# Patient Record
Sex: MALE | Race: OTHER | HISPANIC OR LATINO | URBAN - METROPOLITAN AREA
[De-identification: names, ages, dates, MRNs, and addresses within clinical notes are randomized per-mention and may not be internally consistent; named-entity substitution may affect disease eponyms.]

---

## 2020-02-02 ENCOUNTER — EMERGENCY (EMERGENCY)
Facility: HOSPITAL | Age: 50
LOS: 1 days | Discharge: HOME | End: 2020-02-02
Attending: EMERGENCY MEDICINE
Payer: SELF-PAY

## 2020-02-02 VITALS
DIASTOLIC BLOOD PRESSURE: 97 MMHG | HEIGHT: 64.96 IN | HEART RATE: 91 BPM | WEIGHT: 214.95 LBS | SYSTOLIC BLOOD PRESSURE: 142 MMHG | OXYGEN SATURATION: 99 % | RESPIRATION RATE: 18 BRPM | TEMPERATURE: 99 F

## 2020-02-02 LAB
ACETONE SERPL-MCNC: NEGATIVE — SIGNIFICANT CHANGE UP
ALBUMIN SERPL ELPH-MCNC: 3.8 G/DL — SIGNIFICANT CHANGE UP (ref 3.3–5.2)
ALBUMIN SERPL ELPH-MCNC: 4.4 G/DL — SIGNIFICANT CHANGE UP (ref 3.3–5.2)
ALP SERPL-CCNC: 139 U/L — HIGH (ref 40–120)
ALP SERPL-CCNC: 196 U/L — HIGH (ref 40–120)
ALT FLD-CCNC: 25 U/L — SIGNIFICANT CHANGE UP
ALT FLD-CCNC: 27 U/L — SIGNIFICANT CHANGE UP
ANION GAP SERPL CALC-SCNC: 14 MMOL/L — SIGNIFICANT CHANGE UP (ref 5–17)
ANION GAP SERPL CALC-SCNC: 18 MMOL/L — HIGH (ref 5–17)
APPEARANCE UR: CLEAR — SIGNIFICANT CHANGE UP
AST SERPL-CCNC: 18 U/L — SIGNIFICANT CHANGE UP
AST SERPL-CCNC: <5 U/L — SIGNIFICANT CHANGE UP
BACTERIA # UR AUTO: NEGATIVE — SIGNIFICANT CHANGE UP
BASE EXCESS BLDV CALC-SCNC: 2.5 MMOL/L — HIGH (ref -2–2)
BASOPHILS # BLD AUTO: 0.02 K/UL — SIGNIFICANT CHANGE UP (ref 0–0.2)
BASOPHILS NFR BLD AUTO: 0.3 % — SIGNIFICANT CHANGE UP (ref 0–2)
BILIRUB SERPL-MCNC: 0.3 MG/DL — LOW (ref 0.4–2)
BILIRUB SERPL-MCNC: 0.5 MG/DL — SIGNIFICANT CHANGE UP (ref 0.4–2)
BILIRUB UR-MCNC: NEGATIVE — SIGNIFICANT CHANGE UP
BUN SERPL-MCNC: 9 MG/DL — SIGNIFICANT CHANGE UP (ref 8–20)
BUN SERPL-MCNC: 9 MG/DL — SIGNIFICANT CHANGE UP (ref 8–20)
CA-I SERPL-SCNC: 1.14 MMOL/L — LOW (ref 1.15–1.33)
CALCIUM SERPL-MCNC: 8.4 MG/DL — LOW (ref 8.6–10.2)
CALCIUM SERPL-MCNC: 9.3 MG/DL — SIGNIFICANT CHANGE UP (ref 8.6–10.2)
CHLORIDE BLDV-SCNC: 96 MMOL/L — LOW (ref 98–107)
CHLORIDE SERPL-SCNC: 87 MMOL/L — LOW (ref 98–107)
CHLORIDE SERPL-SCNC: 98 MMOL/L — SIGNIFICANT CHANGE UP (ref 98–107)
CK SERPL-CCNC: 103 U/L — SIGNIFICANT CHANGE UP (ref 30–200)
CO2 SERPL-SCNC: 22 MMOL/L — SIGNIFICANT CHANGE UP (ref 22–29)
CO2 SERPL-SCNC: 23 MMOL/L — SIGNIFICANT CHANGE UP (ref 22–29)
COLOR SPEC: YELLOW — SIGNIFICANT CHANGE UP
CREAT SERPL-MCNC: 0.69 MG/DL — SIGNIFICANT CHANGE UP (ref 0.5–1.3)
CREAT SERPL-MCNC: 0.69 MG/DL — SIGNIFICANT CHANGE UP (ref 0.5–1.3)
DIFF PNL FLD: ABNORMAL
EOSINOPHIL # BLD AUTO: 0.11 K/UL — SIGNIFICANT CHANGE UP (ref 0–0.5)
EOSINOPHIL NFR BLD AUTO: 1.6 % — SIGNIFICANT CHANGE UP (ref 0–6)
EPI CELLS # UR: NEGATIVE — SIGNIFICANT CHANGE UP
GAS PNL BLDV: 139 MMOL/L — SIGNIFICANT CHANGE UP (ref 135–145)
GAS PNL BLDV: SIGNIFICANT CHANGE UP
GAS PNL BLDV: SIGNIFICANT CHANGE UP
GLUCOSE BLDC GLUCOMTR-MCNC: 255 MG/DL — HIGH (ref 70–99)
GLUCOSE BLDC GLUCOMTR-MCNC: 312 MG/DL — HIGH (ref 70–99)
GLUCOSE BLDV-MCNC: 403 MG/DL — HIGH (ref 70–99)
GLUCOSE SERPL-MCNC: 280 MG/DL — HIGH (ref 70–99)
GLUCOSE SERPL-MCNC: 414 MG/DL — HIGH (ref 70–99)
GLUCOSE UR QL: 1000 MG/DL
HBA1C BLD-MCNC: 12 % — HIGH (ref 4–5.6)
HCO3 BLDV-SCNC: 26 MMOL/L — SIGNIFICANT CHANGE UP (ref 21–29)
HCT VFR BLD CALC: 44.6 % — SIGNIFICANT CHANGE UP (ref 39–50)
HCT VFR BLDA CALC: 50 — SIGNIFICANT CHANGE UP (ref 39–50)
HGB BLD CALC-MCNC: 16.4 G/DL — SIGNIFICANT CHANGE UP (ref 13–17)
HGB BLD-MCNC: 15.6 G/DL — SIGNIFICANT CHANGE UP (ref 13–17)
HIV 1 & 2 AB SERPL IA.RAPID: SIGNIFICANT CHANGE UP
IMM GRANULOCYTES NFR BLD AUTO: 0.4 % — SIGNIFICANT CHANGE UP (ref 0–1.5)
KETONES UR-MCNC: ABNORMAL
LACTATE BLDV-MCNC: 1.8 MMOL/L — SIGNIFICANT CHANGE UP (ref 0.5–2)
LEUKOCYTE ESTERASE UR-ACNC: NEGATIVE — SIGNIFICANT CHANGE UP
LYMPHOCYTES # BLD AUTO: 2.65 K/UL — SIGNIFICANT CHANGE UP (ref 1–3.3)
LYMPHOCYTES # BLD AUTO: 39.7 % — SIGNIFICANT CHANGE UP (ref 13–44)
MCHC RBC-ENTMCNC: 29.4 PG — SIGNIFICANT CHANGE UP (ref 27–34)
MCHC RBC-ENTMCNC: 35 GM/DL — SIGNIFICANT CHANGE UP (ref 32–36)
MCV RBC AUTO: 84 FL — SIGNIFICANT CHANGE UP (ref 80–100)
MONOCYTES # BLD AUTO: 0.41 K/UL — SIGNIFICANT CHANGE UP (ref 0–0.9)
MONOCYTES NFR BLD AUTO: 6.1 % — SIGNIFICANT CHANGE UP (ref 2–14)
NEUTROPHILS # BLD AUTO: 3.46 K/UL — SIGNIFICANT CHANGE UP (ref 1.8–7.4)
NEUTROPHILS NFR BLD AUTO: 51.9 % — SIGNIFICANT CHANGE UP (ref 43–77)
NITRITE UR-MCNC: NEGATIVE — SIGNIFICANT CHANGE UP
OTHER CELLS CSF MANUAL: 18 ML/DL — SIGNIFICANT CHANGE UP (ref 18–22)
PCO2 BLDV: 46 MMHG — SIGNIFICANT CHANGE UP (ref 35–50)
PH BLDV: 7.39 — SIGNIFICANT CHANGE UP (ref 7.32–7.43)
PH UR: 5 — SIGNIFICANT CHANGE UP (ref 5–8)
PLATELET # BLD AUTO: 173 K/UL — SIGNIFICANT CHANGE UP (ref 150–400)
PO2 BLDV: 42 MMHG — SIGNIFICANT CHANGE UP (ref 25–45)
POTASSIUM BLDV-SCNC: 3.9 MMOL/L — SIGNIFICANT CHANGE UP (ref 3.4–4.5)
POTASSIUM SERPL-MCNC: 3.8 MMOL/L — SIGNIFICANT CHANGE UP (ref 3.5–5.3)
POTASSIUM SERPL-MCNC: 3.8 MMOL/L — SIGNIFICANT CHANGE UP (ref 3.5–5.3)
POTASSIUM SERPL-SCNC: 3.8 MMOL/L — SIGNIFICANT CHANGE UP (ref 3.5–5.3)
POTASSIUM SERPL-SCNC: 3.8 MMOL/L — SIGNIFICANT CHANGE UP (ref 3.5–5.3)
PROT SERPL-MCNC: 6.5 G/DL — LOW (ref 6.6–8.7)
PROT SERPL-MCNC: 7.4 G/DL — SIGNIFICANT CHANGE UP (ref 6.6–8.7)
PROT UR-MCNC: 30 MG/DL
RBC # BLD: 5.31 M/UL — SIGNIFICANT CHANGE UP (ref 4.2–5.8)
RBC # FLD: 12.5 % — SIGNIFICANT CHANGE UP (ref 10.3–14.5)
RBC CASTS # UR COMP ASSIST: SIGNIFICANT CHANGE UP /HPF (ref 0–4)
SAO2 % BLDV: 80 % — SIGNIFICANT CHANGE UP
SODIUM SERPL-SCNC: 127 MMOL/L — LOW (ref 135–145)
SODIUM SERPL-SCNC: 135 MMOL/L — SIGNIFICANT CHANGE UP (ref 135–145)
SP GR SPEC: 1.01 — SIGNIFICANT CHANGE UP (ref 1.01–1.02)
TROPONIN T SERPL-MCNC: <0.01 NG/ML — SIGNIFICANT CHANGE UP (ref 0–0.06)
TSH SERPL-MCNC: 2.76 UIU/ML — SIGNIFICANT CHANGE UP (ref 0.27–4.2)
UROBILINOGEN FLD QL: NEGATIVE MG/DL — SIGNIFICANT CHANGE UP
WBC # BLD: 6.68 K/UL — SIGNIFICANT CHANGE UP (ref 3.8–10.5)
WBC # FLD AUTO: 6.68 K/UL — SIGNIFICANT CHANGE UP (ref 3.8–10.5)
WBC UR QL: SIGNIFICANT CHANGE UP

## 2020-02-02 PROCEDURE — 93010 ELECTROCARDIOGRAM REPORT: CPT

## 2020-02-02 PROCEDURE — 99218: CPT

## 2020-02-02 PROCEDURE — 71046 X-RAY EXAM CHEST 2 VIEWS: CPT | Mod: 26

## 2020-02-02 PROCEDURE — 70450 CT HEAD/BRAIN W/O DYE: CPT | Mod: 26

## 2020-02-02 RX ORDER — SODIUM CHLORIDE 9 MG/ML
1000 INJECTION, SOLUTION INTRAVENOUS
Refills: 0 | Status: DISCONTINUED | OUTPATIENT
Start: 2020-02-02 | End: 2020-02-07

## 2020-02-02 RX ORDER — FLUCONAZOLE 150 MG/1
150 TABLET ORAL ONCE
Refills: 0 | Status: COMPLETED | OUTPATIENT
Start: 2020-02-02 | End: 2020-02-02

## 2020-02-02 RX ORDER — INSULIN LISPRO 100/ML
VIAL (ML) SUBCUTANEOUS
Refills: 0 | Status: DISCONTINUED | OUTPATIENT
Start: 2020-02-02 | End: 2020-02-07

## 2020-02-02 RX ORDER — INSULIN HUMAN 100 [IU]/ML
6 INJECTION, SOLUTION SUBCUTANEOUS ONCE
Refills: 0 | Status: COMPLETED | OUTPATIENT
Start: 2020-02-02 | End: 2020-02-02

## 2020-02-02 RX ORDER — DEXTROSE 50 % IN WATER 50 %
25 SYRINGE (ML) INTRAVENOUS ONCE
Refills: 0 | Status: DISCONTINUED | OUTPATIENT
Start: 2020-02-02 | End: 2020-02-07

## 2020-02-02 RX ORDER — METFORMIN HYDROCHLORIDE 850 MG/1
500 TABLET ORAL
Refills: 0 | Status: DISCONTINUED | OUTPATIENT
Start: 2020-02-02 | End: 2020-02-07

## 2020-02-02 RX ORDER — HUMAN INSULIN 100 [IU]/ML
24 INJECTION, SUSPENSION SUBCUTANEOUS
Refills: 0 | Status: DISCONTINUED | OUTPATIENT
Start: 2020-02-02 | End: 2020-02-07

## 2020-02-02 RX ORDER — FLUCONAZOLE 150 MG/1
100 TABLET ORAL DAILY
Refills: 0 | Status: DISCONTINUED | OUTPATIENT
Start: 2020-02-03 | End: 2020-02-07

## 2020-02-02 RX ORDER — GLUCAGON INJECTION, SOLUTION 0.5 MG/.1ML
1 INJECTION, SOLUTION SUBCUTANEOUS ONCE
Refills: 0 | Status: DISCONTINUED | OUTPATIENT
Start: 2020-02-02 | End: 2020-02-07

## 2020-02-02 RX ORDER — DEXTROSE 50 % IN WATER 50 %
12.5 SYRINGE (ML) INTRAVENOUS ONCE
Refills: 0 | Status: DISCONTINUED | OUTPATIENT
Start: 2020-02-02 | End: 2020-02-07

## 2020-02-02 RX ORDER — SODIUM CHLORIDE 9 MG/ML
3000 INJECTION INTRAMUSCULAR; INTRAVENOUS; SUBCUTANEOUS ONCE
Refills: 0 | Status: COMPLETED | OUTPATIENT
Start: 2020-02-02 | End: 2020-02-02

## 2020-02-02 RX ORDER — HUMAN INSULIN 100 [IU]/ML
24 INJECTION, SUSPENSION SUBCUTANEOUS AT BEDTIME
Refills: 0 | Status: DISCONTINUED | OUTPATIENT
Start: 2020-02-02 | End: 2020-02-07

## 2020-02-02 RX ORDER — DEXTROSE 50 % IN WATER 50 %
15 SYRINGE (ML) INTRAVENOUS ONCE
Refills: 0 | Status: DISCONTINUED | OUTPATIENT
Start: 2020-02-02 | End: 2020-02-07

## 2020-02-02 RX ADMIN — SODIUM CHLORIDE 3000 MILLILITER(S): 9 INJECTION INTRAMUSCULAR; INTRAVENOUS; SUBCUTANEOUS at 17:25

## 2020-02-02 RX ADMIN — INSULIN HUMAN 6 UNIT(S): 100 INJECTION, SOLUTION SUBCUTANEOUS at 09:58

## 2020-02-02 RX ADMIN — SODIUM CHLORIDE 3000 MILLILITER(S): 9 INJECTION INTRAMUSCULAR; INTRAVENOUS; SUBCUTANEOUS at 09:56

## 2020-02-02 RX ADMIN — HUMAN INSULIN 24 UNIT(S): 100 INJECTION, SUSPENSION SUBCUTANEOUS at 21:09

## 2020-02-02 RX ADMIN — METFORMIN HYDROCHLORIDE 500 MILLIGRAM(S): 850 TABLET ORAL at 17:02

## 2020-02-02 RX ADMIN — FLUCONAZOLE 150 MILLIGRAM(S): 150 TABLET ORAL at 10:06

## 2020-02-02 NOTE — ED ADULT NURSE NOTE - OBJECTIVE STATEMENT
Pt arrived to ED c/o blurred vision and loss his eye sight worsening for  the past 3 weeks. Pt states he also has throat pain 2 weeks and states he feels his body trembling is all the time.  Pt also c/o intermittent dizziness. Pt  denies nausea/vomiting. Admits to voiding more frequent at night and  states he feels thirsty and it doesn't go away no matter how much he drinks he still feels thirsty. Pt A & Ox4, breathing even & unlabored.

## 2020-02-02 NOTE — ED PROVIDER NOTE - ENMT, MLM
Airway patent, Nasal mucosa clear. Mouth with normal mucosa. Throat has no vesicles, no oropharyngeal exudates and uvula is midline. patchy white tongue

## 2020-02-02 NOTE — ED CDU PROVIDER INITIAL DAY NOTE - PHYSICAL EXAMINATION
Vital signs noted, see flowsheet.  General: NAD, well appearing and non-toxic.  HEENT: NC/AT. MMM. No nasal discharge, throat without erythema or exudate, tongue with white patches. Conjunctiva and sclera clear b/l.  EOMI. PERRL. No LAD.  Neck: Soft and supple, full ROM without pain.  Cardiac: RRR. +S1/S2. Peripheral pulses 2+ and symmetric b/l.  Respiratory: Speaking in full sentences, no evidence of respiratory distress. Lungs CTA b/l, no wheezes/rhonchi/rales/stridor.   Abdomen: Soft, NTND. No guarding or rebound tenderness. No suprapubic tenderness.  Back: Spine midline and non-tender. No CVAT.  Skin: Normal color for race, no evidence of rash, ecchymosis, cyanosis or jaundice.   Neuro: Awake, alert and oriented to person/place/time/situation. Moves all extremities spontaneously and symmetrically.  No focal deficits or facial droop.  CN II-XII intact. Sensation intact. Strength 5/5. Ambulates with steady gait, no drift. No dysmetria.

## 2020-02-02 NOTE — ED CDU PROVIDER INITIAL DAY NOTE - MEDICAL DECISION MAKING DETAILS
49 M with no PMHx presents to ED c/o bilateral blurry vision, increased fatigue, increased thirst and increased urination with very dry throat x 3 weeks, found to have A1C of 12.0, new onset diabetic. Pt placed in OBS for diabetic protocol, will start insulin and diabetic education, diabetic educator consult.

## 2020-02-02 NOTE — ED PROVIDER NOTE - CLINICAL SUMMARY MEDICAL DECISION MAKING FREE TEXT BOX
pt has symptoms of new onset diabetes, will check labs, r/o acidosis, do ct head to r/o subtle trauma from nature of job and blurred vision, likely diabetic retinopathy, will r/o 2nd cause of hyeprglycemia as welll

## 2020-02-02 NOTE — ED CDU PROVIDER INITIAL DAY NOTE - ATTENDING CONTRIBUTION TO CARE
I personally saw the patient with the PA, and completed the key components of the history and physical exam. I then discussed the management plan with the PA.    HPI as above.     Pt feels well. Notes global blurry vision/ no field deficits or complete visual loss at any time/ flashing or floaters.    Mild intermittent sore throat/ but eating/ drinking normally  No abdominal pain    Pt to receive diabetes education/ initiation of insulin

## 2020-02-02 NOTE — ED PROVIDER NOTE - OBJECTIVE STATEMENT
50 y/o M with no med problem p/w blurred vision for few days with feelign very tired, sleepy and weak and drinking a lot, urinating a lot and has dry throat with throat pain but no fever, chills, diarrhea, chest pain, abd pain, headache, nausea, vomiting. No fall or trauma. Pt works as . Non smoker, no drug abuse

## 2020-02-02 NOTE — ED CDU PROVIDER INITIAL DAY NOTE - OBJECTIVE STATEMENT
49 M with no PMHx presents to ED c/o bilateral blurry vision, increased fatigue, increased thirst and increased urination with very dry throat x 3 weeks. Pt reports family hx of DM in sister. Has not seen PMD in approx 10 years. No fall or trauma. Works as . No hx of HIV. Pt denies fever/chills, CP, SOB, HA, NVD, abd pain, dysuria or hematuria.  Soc: denies tobacco or illicit substance use  Family Hx: sister with recent dx of diabetes

## 2020-02-02 NOTE — ED ADULT NURSE NOTE - CAS ELECT INFOMATION PROVIDED
DC instructions/DC instructions provided to the pt. Patient in understanding of use of insulin at home.

## 2020-02-02 NOTE — ED CDU PROVIDER INITIAL DAY NOTE - NS ED ROS FT
ROS: + BLURRY VISION, + INC URINATION/THIRST, + SORE THROAT. no CP/SOB. no cough. no fever. no n/v/d/c. no abd pain. no rash. no bleeding. no HA. no neck/back pain. no extremity swelling/deformity. No change in mental status.

## 2020-02-02 NOTE — ED PROVIDER NOTE - CARE PLAN
Campbell Poe PA-C
Principal Discharge DX:	Type 2 diabetes mellitus with hyperglycemia, unspecified whether long term insulin use

## 2020-02-02 NOTE — ED ADULT TRIAGE NOTE - CHIEF COMPLAINT QUOTE
as per patient 3 weeks ago he began losing his eye sight - blurry vision, and today he is complains of throat pain 2 weeks ago - and body trembling is all the time and he feels dizzy, denies nausea and patient also states that he has been voiding a lot at night time. states that he needs to drink a lot of water and that his thirst doesn't go away. no fevers noted, states that he feels tired all the time and sleepy- patient states that he has not been working as much due to vision problem, states that he has been stuggling

## 2020-02-03 VITALS
HEART RATE: 87 BPM | SYSTOLIC BLOOD PRESSURE: 143 MMHG | RESPIRATION RATE: 16 BRPM | TEMPERATURE: 98 F | DIASTOLIC BLOOD PRESSURE: 94 MMHG | OXYGEN SATURATION: 98 %

## 2020-02-03 LAB
ANION GAP SERPL CALC-SCNC: 15 MMOL/L — SIGNIFICANT CHANGE UP (ref 5–17)
BUN SERPL-MCNC: 13 MG/DL — SIGNIFICANT CHANGE UP (ref 8–20)
CALCIUM SERPL-MCNC: 9 MG/DL — SIGNIFICANT CHANGE UP (ref 8.6–10.2)
CHLORIDE SERPL-SCNC: 98 MMOL/L — SIGNIFICANT CHANGE UP (ref 98–107)
CO2 SERPL-SCNC: 22 MMOL/L — SIGNIFICANT CHANGE UP (ref 22–29)
CREAT SERPL-MCNC: 0.81 MG/DL — SIGNIFICANT CHANGE UP (ref 0.5–1.3)
GLUCOSE BLDC GLUCOMTR-MCNC: 269 MG/DL — HIGH (ref 70–99)
GLUCOSE BLDC GLUCOMTR-MCNC: 304 MG/DL — HIGH (ref 70–99)
GLUCOSE SERPL-MCNC: 330 MG/DL — HIGH (ref 70–99)
POTASSIUM SERPL-MCNC: 4 MMOL/L — SIGNIFICANT CHANGE UP (ref 3.5–5.3)
POTASSIUM SERPL-SCNC: 4 MMOL/L — SIGNIFICANT CHANGE UP (ref 3.5–5.3)
SODIUM SERPL-SCNC: 135 MMOL/L — SIGNIFICANT CHANGE UP (ref 135–145)

## 2020-02-03 PROCEDURE — 82009 KETONE BODYS QUAL: CPT

## 2020-02-03 PROCEDURE — 80048 BASIC METABOLIC PNL TOTAL CA: CPT

## 2020-02-03 PROCEDURE — 80053 COMPREHEN METABOLIC PANEL: CPT

## 2020-02-03 PROCEDURE — 93005 ELECTROCARDIOGRAM TRACING: CPT

## 2020-02-03 PROCEDURE — 96361 HYDRATE IV INFUSION ADD-ON: CPT

## 2020-02-03 PROCEDURE — 81001 URINALYSIS AUTO W/SCOPE: CPT

## 2020-02-03 PROCEDURE — 82330 ASSAY OF CALCIUM: CPT

## 2020-02-03 PROCEDURE — 82962 GLUCOSE BLOOD TEST: CPT

## 2020-02-03 PROCEDURE — 96360 HYDRATION IV INFUSION INIT: CPT

## 2020-02-03 PROCEDURE — G0378: CPT

## 2020-02-03 PROCEDURE — 84132 ASSAY OF SERUM POTASSIUM: CPT

## 2020-02-03 PROCEDURE — 83605 ASSAY OF LACTIC ACID: CPT

## 2020-02-03 PROCEDURE — 85027 COMPLETE CBC AUTOMATED: CPT

## 2020-02-03 PROCEDURE — 85014 HEMATOCRIT: CPT

## 2020-02-03 PROCEDURE — 99217: CPT

## 2020-02-03 PROCEDURE — 84484 ASSAY OF TROPONIN QUANT: CPT

## 2020-02-03 PROCEDURE — 70450 CT HEAD/BRAIN W/O DYE: CPT

## 2020-02-03 PROCEDURE — 82550 ASSAY OF CK (CPK): CPT

## 2020-02-03 PROCEDURE — 84295 ASSAY OF SERUM SODIUM: CPT

## 2020-02-03 PROCEDURE — 99284 EMERGENCY DEPT VISIT MOD MDM: CPT | Mod: 25

## 2020-02-03 PROCEDURE — 82435 ASSAY OF BLOOD CHLORIDE: CPT

## 2020-02-03 PROCEDURE — 82947 ASSAY GLUCOSE BLOOD QUANT: CPT

## 2020-02-03 PROCEDURE — 86703 HIV-1/HIV-2 1 RESULT ANTBDY: CPT

## 2020-02-03 PROCEDURE — 71046 X-RAY EXAM CHEST 2 VIEWS: CPT

## 2020-02-03 PROCEDURE — T1013: CPT

## 2020-02-03 PROCEDURE — 84443 ASSAY THYROID STIM HORMONE: CPT

## 2020-02-03 PROCEDURE — 82803 BLOOD GASES ANY COMBINATION: CPT

## 2020-02-03 PROCEDURE — 83036 HEMOGLOBIN GLYCOSYLATED A1C: CPT

## 2020-02-03 PROCEDURE — 36415 COLL VENOUS BLD VENIPUNCTURE: CPT

## 2020-02-03 RX ORDER — INSULIN NPH HUM/REG INSULIN HM 70-30/ML
25 VIAL (ML) SUBCUTANEOUS
Qty: 1 | Refills: 0
Start: 2020-02-03 | End: 2020-03-03

## 2020-02-03 RX ADMIN — Medication 6: at 12:17

## 2020-02-03 RX ADMIN — Medication 8: at 08:22

## 2020-02-03 RX ADMIN — FLUCONAZOLE 100 MILLIGRAM(S): 150 TABLET ORAL at 13:00

## 2020-02-03 RX ADMIN — HUMAN INSULIN 24 UNIT(S): 100 INJECTION, SUSPENSION SUBCUTANEOUS at 08:21

## 2020-02-03 RX ADMIN — METFORMIN HYDROCHLORIDE 500 MILLIGRAM(S): 850 TABLET ORAL at 05:11

## 2020-02-03 NOTE — ED ADULT NURSE REASSESSMENT NOTE - GENERAL PATIENT STATE
resting/sleeping
comfortable appearance
resting/sleeping/comfortable appearance
resting/sleeping/smiling/interactive/comfortable appearance/cooperative
comfortable appearance/cooperative
cooperative/comfortable appearance

## 2020-02-03 NOTE — ED ADULT NURSE REASSESSMENT NOTE - NS ED NURSE REASSESS COMMENT FT1
Care endorsed to AKSHAT Cruz. Patient in NAD. Patient pending DM consult. RN with no further questions.
Patient demonstrated correct use of self injecting with insulin. Patient able to demonstrate drawing correct amount of insulin from vial and self injecting at correct site with supervision of RN. Meal tray provided. Patient with no further questions for the RN. Will continue to monitor.
Pt alert and oriented. resting in stretcher, no signs of distress noted. Handoff report given to Maeve Arteaga RN. pt's safety maintained. RN with no questions or concerns at this time
pt sleeping in stretcher, no apparent distress noted. bed in lowest position and safety maintained.
Pt remains A & OX4, breathing even & unlabored. Pt medicated per MD orders, tolerated medication well. Pt offers no complaints at this time, IV NS continues to infuse w/o incident.
pt resting in stretcher, no apparent distress noted. bed in lowest position, call bell within and safety maintained.
assumed pt care from previous RN Maeve Arteaga. pt predominately Kyrgyz speaking. assessment done with ED  Mikaela Rosales.  A&OX4, resting in stretcher, denies any c/o pain/discomfort. no signs of apparent distress noted at this time.  educated on signs/sx of hypo/hyperglycemia, importance of f/s monitoring. awaiting DM eduacator in am. Plan of care reviewed, pt verbalizes (+) understanding.  bed in lowest position, call bell within reach and safety maintained
Assumed care of the patient at 0730. Patient A&Ox4. No s/s of distress or pain. Patient pending DM educator this am. VSS. Patient demonstrated correct use of glucometer with supervision of RN. Patient also educated about self injections of insulin. RN able to demonstrate drawing of correct amount of insulin and injection at correct site. Meal tray provided. Will continue to re-educate. Patient in understanding of plan of care. Patient with no further questions for the RN. Will continue to monitor.
Assumed care of the patient at 1630. verbal report received from Chloe ODEN ED. Patient transferred to observation unit CDU 10. Patient A&Ox4. No s/s of distress or pain. VSS. , patient demonstrated correct use of glucometer with supervision of RN. DM education continues. Patient to see DM educator. IV bolus infusing as per orders, cmp ordered post bolus. meal tray provided. Patient ambulatory. Patient in understanding of plan of care. Patient with no further questions for the RN. Resting in comfort. Call bell within reach and encouraged to use when assistance needed. Will continue to monitor.

## 2020-02-03 NOTE — ED CDU PROVIDER DISPOSITION NOTE - PATIENT PORTAL LINK FT
You can access the FollowMyHealth Patient Portal offered by [facility] by registering at the following website: [email or web address]. By joining [employer]’s FollowMyHealth portal, you will also be able to view your health information using other applications (apps) compatible with our system.

## 2020-02-03 NOTE — ED CDU PROVIDER DISPOSITION NOTE - NSFOLLOWUPINSTRUCTIONS_ED_ALL_ED_FT
1) CHECK YOUR BLOOD SUGAR BEFORE BREAKFAST, DINNER, AND BEDTIME RECORD ALL RESULTS  2) INJECT 25 UNITS INSULIN WITH BREAKFAST AND DINNER  3) YOU HAVE AN APPOINTMENT WITH [facility]IC FEBRUARY 4TH AT 10AM, BRING LOG OF ALL BLOOD SUGARS WITH YOU

## 2020-02-03 NOTE — ED CDU PROVIDER SUBSEQUENT DAY NOTE - ATTENDING CONTRIBUTION TO CARE
I, Sheila Fry MD have personally performed a face to face diagnostic evaluation on this patient.  I have reviewed the ACP note and agree with the history, exam, and plan of care, except as noted.     Exam:  Head: atraumatic, normacephalic  Face: atraumatic, no crepitus no orbiral/maxillary/mandibular ttp  throat: uvula midline no exudates  eyes: perrla eomi  heart: rrr s1s2  lungs: ctab  abd: soft, nt nd +bs no rebound/guarding no cva ttp  skin: warm  LE: no swelling, no calf ttp  back: no midline cervical/thoracic/lumbar ttp    --pt currently notes that is feeling better no complaints in obs for hyperglycemia pending diabetic educator eval--

## 2020-02-03 NOTE — ADVANCED PRACTICE NURSE CONSULT - RECOMMEDATIONS
continue diabetes self management education  pt to draw and inject all insulin doses while in the hospital using insulin syringe and rn supervision  cc- pls set fu appointment w [facility] to improve glycemic control  pls consider dc pt on insulin 70/30 25 units w breakfast and dinner vial and syringe  lancets and strips for countur next ez glucometer

## 2020-02-03 NOTE — ED CDU PROVIDER DISPOSITION NOTE - ATTENDING CONTRIBUTION TO CARE
I, Sheila Fry MD have personally performed a face to face diagnostic evaluation on this patient.  I have reviewed the ACP note and agree with the history, exam, and plan of care, except as noted.     Exam:  Head: atraumatic, normacephalic  Face: atraumatic, no crepitus no orbiral/maxillary/mandibular ttp  throat: uvula midline no exudates  eyes: perrla eomi  heart: rrr s1s2  lungs: ctab  abd: soft, nt nd +bs no rebound/guarding no cva ttp  skin: warm  LE: no swelling, no calf ttp  back: no midline cervical/thoracic/lumbar ttp    --pt currently notes that is feeling better no complaints in obs for hyperglycemia evaluated by diabetic educator will go home with insulin --pt educated  in obs and is capable to inject insulin as observed by the diabetic educator and nurses --luis ruiz

## 2020-02-03 NOTE — ED CDU PROVIDER SUBSEQUENT DAY NOTE - CONSTITUTIONAL MENTATION
----- Message from Margie Szymanski sent at 7/24/2018  9:58 AM CDT -----  [facility]  Has questions regarding the patient's surgery. Office visit showed patient had aneurysm, needs to know if this is something Dr Rincon will watch. Please call back at .  
alert/awake/oriented to person, place, time/situation

## 2020-02-03 NOTE — ED ADULT NURSE REASSESSMENT NOTE - SYMPTOMS
none
+hiccuping following reposition from supine to supported sit  (-) rooting/NNS on pacifier or gloved finger c facial grimace - decreased interest possibly 2/2 recently post PO feed

## 2020-02-03 NOTE — ED ADULT NURSE REASSESSMENT NOTE - COMFORT CARE
plan of care explained
wait time explained/plan of care explained/po fluids offered/side rails up/darkened lights/meal provided/ambulated to bathroom
wait time explained/po fluids offered/side rails up/meal provided/ambulated to bathroom/plan of care explained

## 2020-02-03 NOTE — ADVANCED PRACTICE NURSE CONSULT - ASSESSMENT
went to see pt in am, pt is a+ox3 co 0 pain. pt states in english that he didnt know that he had diabetes. he co polyuria polydepsia and blurred vision went to see pt in am, pt is a+ox3 co 0 pain. pt states in english that he didnt know that he had diabetes. he co polyuria polydepsia and blurred vision. pt was educated about diabetes disease process and the importance of starting inuslin. pt was educated about the importnce of bg monitoring went to see pt in am, pt is a+ox3 co 0 pain. pt states in english that he didnt know that he had diabetes. he co polyuria polydepsia and blurred vision. pt was educated about diabetes disease process and the importance of starting inuslin and to take it w breakfast and dinner, . pt was educated about the importnce of bg monitoring, a new glucometer provided.pt was advised to check bg before breakfast and dinnerpt was advised to fu w [facility] to improve glycemic control. went to see pt in am, pt is a+ox3 co 0 pain. pt states in english that he didnt know that he had diabetes. he co polyuria polydepsia and blurred vision. pt was educated about diabetes disease process and the importance of starting inuslin and to take it w breakfast and dinner, . pt was educated about the importnce of bg monitoring, a new glucometer provided.pt was advised to check bg before breakfast and dinnerpt was advised to fu w [facility] to improve glycemic control. pt has a fu appointment w [facility] for february 4 2020 @10a

## 2021-02-05 ENCOUNTER — EMERGENCY (EMERGENCY)
Facility: HOSPITAL | Age: 51
LOS: 1 days | Discharge: HOME | End: 2021-02-05
Attending: STUDENT IN AN ORGANIZED HEALTH CARE EDUCATION/TRAINING PROGRAM
Payer: SELF-PAY

## 2021-02-05 VITALS
RESPIRATION RATE: 18 BRPM | OXYGEN SATURATION: 95 % | TEMPERATURE: 98 F | DIASTOLIC BLOOD PRESSURE: 97 MMHG | SYSTOLIC BLOOD PRESSURE: 150 MMHG | HEART RATE: 79 BPM | WEIGHT: 164.91 LBS | HEIGHT: 64.96 IN

## 2021-02-05 LAB
APPEARANCE UR: CLEAR — SIGNIFICANT CHANGE UP
BASE EXCESS BLDA CALC-SCNC: 1.9 MMOL/L — SIGNIFICANT CHANGE UP (ref -2–2)
BILIRUB UR-MCNC: NEGATIVE — SIGNIFICANT CHANGE UP
BLOOD GAS COMMENTS ARTERIAL: SIGNIFICANT CHANGE UP
COLOR SPEC: YELLOW — SIGNIFICANT CHANGE UP
DIFF PNL FLD: ABNORMAL
EPI CELLS # UR: SIGNIFICANT CHANGE UP
GAS PNL BLDA: SIGNIFICANT CHANGE UP
GLUCOSE UR QL: 1000 MG/DL
HCO3 BLDA-SCNC: 26 MMOL/L — SIGNIFICANT CHANGE UP (ref 20–26)
HOROWITZ INDEX BLDA+IHG-RTO: 0.21 — SIGNIFICANT CHANGE UP
KETONES UR-MCNC: ABNORMAL
LEUKOCYTE ESTERASE UR-ACNC: NEGATIVE — SIGNIFICANT CHANGE UP
NITRITE UR-MCNC: NEGATIVE — SIGNIFICANT CHANGE UP
PCO2 BLDA: 32 MMHG — LOW (ref 35–45)
PH BLDA: 7.49 — HIGH (ref 7.35–7.45)
PH UR: 6 — SIGNIFICANT CHANGE UP (ref 5–8)
PO2 BLDA: 115 MMHG — HIGH (ref 83–108)
PROT UR-MCNC: 30 MG/DL
RBC CASTS # UR COMP ASSIST: SIGNIFICANT CHANGE UP /HPF (ref 0–4)
SAO2 % BLDA: 100 % — HIGH (ref 95–99)
SP GR SPEC: 1.01 — SIGNIFICANT CHANGE UP (ref 1.01–1.02)
UROBILINOGEN FLD QL: NEGATIVE MG/DL — SIGNIFICANT CHANGE UP
WBC UR QL: NEGATIVE — SIGNIFICANT CHANGE UP

## 2021-02-05 PROCEDURE — 99284 EMERGENCY DEPT VISIT MOD MDM: CPT

## 2021-02-05 RX ORDER — SODIUM CHLORIDE 9 MG/ML
1000 INJECTION INTRAMUSCULAR; INTRAVENOUS; SUBCUTANEOUS ONCE
Refills: 0 | Status: COMPLETED | OUTPATIENT
Start: 2021-02-05 | End: 2021-02-05

## 2021-02-05 RX ORDER — SODIUM CHLORIDE 9 MG/ML
3 INJECTION INTRAMUSCULAR; INTRAVENOUS; SUBCUTANEOUS EVERY 8 HOURS
Refills: 0 | Status: DISCONTINUED | OUTPATIENT
Start: 2021-02-05 | End: 2021-02-10

## 2021-02-05 RX ADMIN — SODIUM CHLORIDE 1000 MILLILITER(S): 9 INJECTION INTRAMUSCULAR; INTRAVENOUS; SUBCUTANEOUS at 23:46

## 2021-02-05 RX ADMIN — SODIUM CHLORIDE 3 MILLILITER(S): 9 INJECTION INTRAMUSCULAR; INTRAVENOUS; SUBCUTANEOUS at 23:46

## 2021-02-05 NOTE — ED STATDOCS - NS_ ATTENDINGSCRIBEDETAILS _ED_A_ED_FT
I, Carin Long, performed the initial face to face bedside interview with this patient regarding history of present illness, review of symptoms and relevant past medical, social and family history.  I completed an independent physical examination.  I was the initial provider who evaluated this patient.  The history, relevant review of systems, past medical and surgical history, medical decision making, and physical examination was documented by the scribe in my presence and I attest to the accuracy of the documentation.  I have signed out the follow up of any pending tests (i.e. labs, radiological studies) to the ACP.  I have communicated the patient’s plan of care and disposition with the ACP.

## 2021-02-05 NOTE — ED ADULT TRIAGE NOTE - CHIEF COMPLAINT QUOTE
patient was sent by PMD for elevated glucose levels, patient is a diabetic does not check levels , denies symptoms, yesterday 365

## 2021-02-05 NOTE — ED STATDOCS - OBJECTIVE STATEMENT
51 y/o male with PMHx of uncontrolled DM, HTN, HLD, presents to the ED sent by PMD for elevated serum ketones in urine. Pt states he went to the [facility] yesterday and was told to come to ED for evaluation for DKA. Denies cough, vomiting, headache, fever or chills. Pt referral reviewed. 51 y/o male with PMHx of uncontrolled DM, HTN, HLD, presents to the ED sent by PMD due to his diabetes. Pt states he went to the [facility] yesterday and was evaluated. He was told to come to ED for evaluation for DKA. Denies cough, vomiting, headache, fever or chills. Pt referral form reviewed.

## 2021-02-05 NOTE — ED STATDOCS - CHPI ED SYMPTOM NEG
no fever/no vomiting no burning urination/no fever/no hematuria/no nausea/no abdominal distention/no diarrhea/no dysuria/no vomiting/no palpitations

## 2021-02-05 NOTE — ED STATDOCS - PATIENT PORTAL LINK FT
You can access the FollowMyHealth Patient Portal offered by [facility] by registering at the following website: [email or web address]. By joining [employer]’s FollowMyHealth portal, you will also be able to view your health information using other applications (apps) compatible with our system.

## 2021-02-05 NOTE — ED STATDOCS - PROGRESS NOTE DETAILS
PA note: PT evaluated by intake physician. HPI/PE/ROS as noted above. Will follow up plan per intake physician PA note: No signs DKA, no gap, neg acetone. Pt feeling well, asymptomatic. Rpt FSG improved after fluids. All results d/w pt with ED  Luis Reyes. Instructed to f/u with [facility] in 3-4 days for further management of diabetes. Given copy of all results, Return precautions discussed, stable for dc.

## 2021-02-05 NOTE — ED STATDOCS - NSFOLLOWUPINSTRUCTIONS_ED_ALL_ED_FT
- Follow up with your doctor within 3-4 days for further management of your diabetes  - Return to the ED for any new or worsening symptoms.    Hyperglycemia    Hyperglycemia occurs when the glucose (sugar) level in your blood is too high. Symptoms include increased urination, increased thirst, a dry mouth, or changes in appetite. If started on a medication, take exactly as prescribed by your health care professional. Maintain a healthy lifestyle and follow up with your primary care physician.    SEEK IMMEDIATE MEDICAL CARE IF YOU HAVE ANY OF THE FOLLOWING SYMPTOMS: shortness of breath, change in mental status, nausea or vomiting, fruity smell to your breath, or any signs of dehydration.       - Elaina un seguimiento con michael médico dentro de los 3-4 días para un mayor control de michael diabetes  - Regrese al servicio de urgencias por cualquier síntoma nuevo o que empeore.    Hiperglucemia    La hiperglucemia ocurre cuando el nivel de glucosa (azúcar) en michael khushi es demasiado alto. Los síntomas incluyen aumento de la micción, aumento de la sed, sequedad de boca o cambios en el apetito. Si comenzó con un medicamento, tómelo exactamente según lo prescrito por michael profesional de la ciera. Mantenga un estilo de sarah saludable y elaina un seguimiento con michael médico de atención primaria.    BUSQUE ATENCIÓN MÉDICA INMEDIATA SI TIENE ALGUNO DE LOS SIGUIENTES SÍNTOMAS: dificultad para respirar, cambio en el estado mental, náuseas o vómitos, olor afrutado en el aliento o cualquier signo de deshidratación.

## 2021-02-06 PROBLEM — Z78.9 OTHER SPECIFIED HEALTH STATUS: Chronic | Status: ACTIVE | Noted: 2020-02-02

## 2021-02-06 LAB
ACETONE SERPL-MCNC: NEGATIVE — SIGNIFICANT CHANGE UP
ALBUMIN SERPL ELPH-MCNC: 4.5 G/DL — SIGNIFICANT CHANGE UP (ref 3.3–5.2)
ALP SERPL-CCNC: 205 U/L — HIGH (ref 40–120)
ALT FLD-CCNC: 22 U/L — SIGNIFICANT CHANGE UP
ANION GAP SERPL CALC-SCNC: 13 MMOL/L — SIGNIFICANT CHANGE UP (ref 5–17)
AST SERPL-CCNC: 19 U/L — SIGNIFICANT CHANGE UP
BASOPHILS # BLD AUTO: 0.04 K/UL — SIGNIFICANT CHANGE UP (ref 0–0.2)
BASOPHILS NFR BLD AUTO: 0.5 % — SIGNIFICANT CHANGE UP (ref 0–2)
BILIRUB SERPL-MCNC: 0.2 MG/DL — LOW (ref 0.4–2)
BUN SERPL-MCNC: 11 MG/DL — SIGNIFICANT CHANGE UP (ref 8–20)
CALCIUM SERPL-MCNC: 9.3 MG/DL — SIGNIFICANT CHANGE UP (ref 8.6–10.2)
CHLORIDE SERPL-SCNC: 97 MMOL/L — LOW (ref 98–107)
CO2 SERPL-SCNC: 26 MMOL/L — SIGNIFICANT CHANGE UP (ref 22–29)
CREAT SERPL-MCNC: 0.71 MG/DL — SIGNIFICANT CHANGE UP (ref 0.5–1.3)
EOSINOPHIL # BLD AUTO: 0.18 K/UL — SIGNIFICANT CHANGE UP (ref 0–0.5)
EOSINOPHIL NFR BLD AUTO: 2.1 % — SIGNIFICANT CHANGE UP (ref 0–6)
GLUCOSE SERPL-MCNC: 393 MG/DL — HIGH (ref 70–99)
HCT VFR BLD CALC: 44.6 % — SIGNIFICANT CHANGE UP (ref 39–50)
HGB BLD-MCNC: 15.6 G/DL — SIGNIFICANT CHANGE UP (ref 13–17)
IMM GRANULOCYTES NFR BLD AUTO: 0.5 % — SIGNIFICANT CHANGE UP (ref 0–1.5)
LYMPHOCYTES # BLD AUTO: 3.96 K/UL — HIGH (ref 1–3.3)
LYMPHOCYTES # BLD AUTO: 46.1 % — HIGH (ref 13–44)
MCHC RBC-ENTMCNC: 29.3 PG — SIGNIFICANT CHANGE UP (ref 27–34)
MCHC RBC-ENTMCNC: 35 GM/DL — SIGNIFICANT CHANGE UP (ref 32–36)
MCV RBC AUTO: 83.7 FL — SIGNIFICANT CHANGE UP (ref 80–100)
MONOCYTES # BLD AUTO: 0.5 K/UL — SIGNIFICANT CHANGE UP (ref 0–0.9)
MONOCYTES NFR BLD AUTO: 5.8 % — SIGNIFICANT CHANGE UP (ref 2–14)
NEUTROPHILS # BLD AUTO: 3.87 K/UL — SIGNIFICANT CHANGE UP (ref 1.8–7.4)
NEUTROPHILS NFR BLD AUTO: 45 % — SIGNIFICANT CHANGE UP (ref 43–77)
PLATELET # BLD AUTO: 189 K/UL — SIGNIFICANT CHANGE UP (ref 150–400)
POTASSIUM SERPL-MCNC: 4 MMOL/L — SIGNIFICANT CHANGE UP (ref 3.5–5.3)
POTASSIUM SERPL-SCNC: 4 MMOL/L — SIGNIFICANT CHANGE UP (ref 3.5–5.3)
PROT SERPL-MCNC: 7.3 G/DL — SIGNIFICANT CHANGE UP (ref 6.6–8.7)
RBC # BLD: 5.33 M/UL — SIGNIFICANT CHANGE UP (ref 4.2–5.8)
RBC # FLD: 12.9 % — SIGNIFICANT CHANGE UP (ref 10.3–14.5)
SODIUM SERPL-SCNC: 136 MMOL/L — SIGNIFICANT CHANGE UP (ref 135–145)
WBC # BLD: 8.59 K/UL — SIGNIFICANT CHANGE UP (ref 3.8–10.5)
WBC # FLD AUTO: 8.59 K/UL — SIGNIFICANT CHANGE UP (ref 3.8–10.5)

## 2021-02-06 PROCEDURE — 80053 COMPREHEN METABOLIC PANEL: CPT

## 2021-02-06 PROCEDURE — 82009 KETONE BODYS QUAL: CPT

## 2021-02-06 PROCEDURE — 99283 EMERGENCY DEPT VISIT LOW MDM: CPT

## 2021-02-06 PROCEDURE — 36415 COLL VENOUS BLD VENIPUNCTURE: CPT

## 2021-02-06 PROCEDURE — 82803 BLOOD GASES ANY COMBINATION: CPT

## 2021-02-06 PROCEDURE — 82962 GLUCOSE BLOOD TEST: CPT

## 2021-02-06 PROCEDURE — 85025 COMPLETE CBC W/AUTO DIFF WBC: CPT

## 2021-02-06 PROCEDURE — 81001 URINALYSIS AUTO W/SCOPE: CPT
